# Patient Record
Sex: MALE | URBAN - METROPOLITAN AREA
[De-identification: names, ages, dates, MRNs, and addresses within clinical notes are randomized per-mention and may not be internally consistent; named-entity substitution may affect disease eponyms.]

---

## 2024-07-19 ENCOUNTER — APPOINTMENT (RX ONLY)
Dept: URBAN - METROPOLITAN AREA CLINIC 98 | Facility: CLINIC | Age: 44
Setting detail: DERMATOLOGY
End: 2024-07-19

## 2024-07-19 DIAGNOSIS — L72.8 OTHER FOLLICULAR CYSTS OF THE SKIN AND SUBCUTANEOUS TISSUE: ICD-10-CM

## 2024-07-19 DIAGNOSIS — L85.3 XEROSIS CUTIS: ICD-10-CM

## 2024-07-19 PROCEDURE — ? CPT CODE GENERATOR

## 2024-07-19 PROCEDURE — ? TREATMENT REGIMEN

## 2024-07-19 PROCEDURE — ? PRESCRIPTION

## 2024-07-19 PROCEDURE — 99203 OFFICE O/P NEW LOW 30 MIN: CPT

## 2024-07-19 PROCEDURE — ? COUNSELING

## 2024-07-19 PROCEDURE — ? CONSULTATION EXCISION

## 2024-07-19 RX ORDER — MUPIROCIN 20 MG/G
OINTMENT TOPICAL
Qty: 15 | Refills: 0 | Status: ERX | COMMUNITY
Start: 2024-07-19

## 2024-07-19 RX ADMIN — MUPIROCIN: 20 OINTMENT TOPICAL at 00:00

## 2024-07-19 ASSESSMENT — LOCATION DETAILED DESCRIPTION DERM
LOCATION DETAILED: RIGHT INFERIOR POSTAURICULAR SKIN
LOCATION DETAILED: RIGHT POSTERIOR EAR

## 2024-07-19 ASSESSMENT — LOCATION ZONE DERM
LOCATION ZONE: SCALP
LOCATION ZONE: EAR

## 2024-07-19 ASSESSMENT — LOCATION SIMPLE DESCRIPTION DERM
LOCATION SIMPLE: POSTERIOR SCALP
LOCATION SIMPLE: RIGHT EAR

## 2024-07-19 NOTE — PROCEDURE: CPT CODE GENERATOR
36814 Price: 0.00
Show J-Codes?: No
Which Photosensitizer Was Used?: Levulan
Anticipated Excised Diameter (Required): 1.1 - 2.0 cm
Anticipated Depth And Size Of Soft Tissue Excision (Required): Subcutaneous, Less than 3 cm
First Biopsy Type: Tangential Biopsy
Number Of Lesions Injected: Less than 8 lesions
How Many Lesions Are You Destroying?: Less than 15
Fee Schedule Discount For Cash Pay Patients In % Off Of Fee Schedule: 0
How Many Lesions Are You Destroying?: 1
Detail Level: None
Location (Required): Ears
Anticipated Wound Length (Required): 2.5 cm or less
Fee Schedule Discount In % Off Of Fee Schedule: Standard Fee Schedule as Entered in Pricing Tab
First Procedure You Would Like A Quote For?: Benign Excision
Anticipated Depth And Size Of Soft Tissue Excision (Required): Subcutaneous, Less than 1.5 cm
Anticipated Depth And Size Of Soft Tissue Excision (Required): Subcutaneous, Less than 2 cm
Repair: Intermediate Primary Closure

## 2025-05-06 NOTE — PROCEDURE: TREATMENT REGIMEN
-- DO NOT REPLY / DO NOT REPLY ALL --  -- This inbox is not monitored  -- Message is from [facility] Operations ([facility]) --    Medication: amLODIPine (NORVASC) 5 MG tablet   Last office visit date: 03/06/2025  Medication Refill Protocol Failed.  Class: Historical Med     Medication: simvastatin (ZOCOR) 20 MG tablet   Last office visit date: 03/06/2025  Medication Refill Protocol Failed.  Class: Historical Med     [employer] texted and needs provider review.  
Copied from CRM #[id number]. Topic: MW Medication/Rx - MW Rx Refill  >> May 5, 2025  3:54 PM Mirna HINOJOSA wrote:  Ruma Monroy called to request a medication refill and currently has medication during working hrs.  Medication is:    Listed on Med Management list. Pended medication. Selected 'Wrap Up CRM' and created new Refill Med Encounter after clicking 'Convert to Clinical Call'. Sent Med template and routed as routine priority per Care Site Dept KB page for Med Refill Working Hrs support to appropriate clinical pool. Readback full message.  
Copied from CRM #[id number]. Topic: MW Medication/Rx - MW Rx Refill  >> May 5, 2025  3:54 PM Mirna HINOJOSA wrote:  Ruma Monroy called to request a medication refill and currently has medication during working hrs.  Medication is:    Listed on Med Management list. Pended medication. Selected 'Wrap Up CRM' and created new Refill Med Encounter after clicking 'Convert to Clinical Call'. Sent Med template and routed as routine priority per Care Site Dept KB page for Med Refill Working Hrs support to appropriate clinical pool. Readback full message.  -- DO NOT REPLY / DO NOT REPLY ALL --  -- This inbox is not monitored. If this was sent to the wrong provider or department, reroute message to P [facility] Reroute pool. --  -- Message is from [facility] Operations ([facility]) --      Message Type:  Refill Medication   Refill request for Pended medication named: Amlodipine, simvastatin   Preferred pharmacy verified, and selected.   [id number] - [facility] [address]    Is the patient OUT of Medication?  No Medication Refills handled by [facility] Clinical - route as routine priority to [facility] CLINICAL MSG pool. Patient has been advised it will be addressed within 2-3 business days.     Message: Pt is requesting a medication refill ,for the pended medication                   
Per protocol, refer to provider   
Detail Level: Zone
Plan: Patient aware he has three months to schedule an appointment